# Patient Record
Sex: MALE | Race: WHITE | Employment: FULL TIME | URBAN - METROPOLITAN AREA
[De-identification: names, ages, dates, MRNs, and addresses within clinical notes are randomized per-mention and may not be internally consistent; named-entity substitution may affect disease eponyms.]

---

## 2018-03-22 ENCOUNTER — HOSPITAL ENCOUNTER (EMERGENCY)
Age: 33
Discharge: HOME | End: 2018-03-22
Attending: EMERGENCY MEDICINE
Payer: SELF-PAY

## 2018-03-22 ENCOUNTER — APPOINTMENT (OUTPATIENT)
Dept: CT IMAGING | Age: 33
End: 2018-03-22
Attending: EMERGENCY MEDICINE
Payer: SELF-PAY

## 2018-03-22 ENCOUNTER — APPOINTMENT (OUTPATIENT)
Dept: GENERAL RADIOLOGY | Age: 33
End: 2018-03-22
Attending: EMERGENCY MEDICINE
Payer: SELF-PAY

## 2018-03-22 VITALS
TEMPERATURE: 98 F | RESPIRATION RATE: 16 BRPM | HEIGHT: 68 IN | OXYGEN SATURATION: 98 % | HEART RATE: 88 BPM | DIASTOLIC BLOOD PRESSURE: 84 MMHG | SYSTOLIC BLOOD PRESSURE: 138 MMHG | WEIGHT: 185 LBS | BODY MASS INDEX: 28.04 KG/M2

## 2018-03-22 DIAGNOSIS — S60.222A CONTUSION OF LEFT HAND, INITIAL ENCOUNTER: ICD-10-CM

## 2018-03-22 DIAGNOSIS — S39.012A BACK STRAIN, INITIAL ENCOUNTER: ICD-10-CM

## 2018-03-22 DIAGNOSIS — M54.16 LUMBAR RADICULOPATHY: Primary | ICD-10-CM

## 2018-03-22 LAB
ATRIAL RATE: 79 BPM
CALCULATED P AXIS, ECG09: 40 DEGREES
CALCULATED R AXIS, ECG10: 37 DEGREES
CALCULATED T AXIS, ECG11: 48 DEGREES
DIAGNOSIS, 93000: NORMAL
P-R INTERVAL, ECG05: 134 MS
Q-T INTERVAL, ECG07: 350 MS
QRS DURATION, ECG06: 92 MS
QTC CALCULATION (BEZET), ECG08: 401 MS
VENTRICULAR RATE, ECG03: 79 BPM

## 2018-03-22 PROCEDURE — 74011250636 HC RX REV CODE- 250/636: Performed by: EMERGENCY MEDICINE

## 2018-03-22 PROCEDURE — 70450 CT HEAD/BRAIN W/O DYE: CPT

## 2018-03-22 PROCEDURE — 96374 THER/PROPH/DIAG INJ IV PUSH: CPT | Performed by: EMERGENCY MEDICINE

## 2018-03-22 PROCEDURE — 99284 EMERGENCY DEPT VISIT MOD MDM: CPT | Performed by: EMERGENCY MEDICINE

## 2018-03-22 PROCEDURE — 93005 ELECTROCARDIOGRAM TRACING: CPT | Performed by: EMERGENCY MEDICINE

## 2018-03-22 PROCEDURE — 72070 X-RAY EXAM THORAC SPINE 2VWS: CPT

## 2018-03-22 PROCEDURE — 73130 X-RAY EXAM OF HAND: CPT

## 2018-03-22 PROCEDURE — 72125 CT NECK SPINE W/O DYE: CPT

## 2018-03-22 PROCEDURE — 96375 TX/PRO/DX INJ NEW DRUG ADDON: CPT | Performed by: EMERGENCY MEDICINE

## 2018-03-22 PROCEDURE — 72100 X-RAY EXAM L-S SPINE 2/3 VWS: CPT

## 2018-03-22 RX ORDER — PHENTERMINE HYDROCHLORIDE 37.5 MG/1
37.5 CAPSULE ORAL
COMMUNITY
End: 2018-03-22

## 2018-03-22 RX ORDER — KETOROLAC TROMETHAMINE 30 MG/ML
15 INJECTION, SOLUTION INTRAMUSCULAR; INTRAVENOUS
Status: COMPLETED | OUTPATIENT
Start: 2018-03-22 | End: 2018-03-22

## 2018-03-22 RX ORDER — DEXAMETHASONE SODIUM PHOSPHATE 100 MG/10ML
10 INJECTION INTRAMUSCULAR; INTRAVENOUS
Status: COMPLETED | OUTPATIENT
Start: 2018-03-22 | End: 2018-03-22

## 2018-03-22 RX ORDER — PREDNISONE 20 MG/1
TABLET ORAL
Qty: 13 TAB | Refills: 0 | Status: SHIPPED | OUTPATIENT
Start: 2018-03-24

## 2018-03-22 RX ADMIN — DEXAMETHASONE SODIUM PHOSPHATE 10 MG: 10 INJECTION INTRAMUSCULAR; INTRAVENOUS at 10:40

## 2018-03-22 RX ADMIN — KETOROLAC TROMETHAMINE 15 MG: 30 INJECTION, SOLUTION INTRAMUSCULAR at 10:39

## 2018-03-22 NOTE — ED PROVIDER NOTES
HPI Comments: Patient had been working on his knees and had been bending over repeatedly lifting up tools when he had a sudden sharp pain in his lower back at the top of his buttocks that radiated up into his upper back. This caused him to fall to the left side and he landed awkwardly on his left leg and hand. Patient has some left hand pain and weakness in his  now. Complained of some weakness in his left leg and feeling like it gave out. Patient is some mild/moderate neck pain as well. He did not lose consciousness. He does complain of a headache. Patient is a 29 y.o. male presenting with back pain. The history is provided by the patient. Back Pain    This is a new problem. The current episode started less than 1 hour ago. The problem has not changed since onset. The problem occurs constantly. The pain is associated with twisting and lifting. The pain is present in the lumbar spine. The quality of the pain is described as sharp and shooting. Radiates to: up back. The pain is at a severity of 7/10. The symptoms are aggravated by certain positions, twisting and bending. Associated symptoms include numbness, leg pain, paresthesias, paresis and weakness. Pertinent negatives include no chest pain, no fever, no headaches, no abdominal pain and no tingling. Past Medical History:   Diagnosis Date    Hypertension        History reviewed. No pertinent surgical history. History reviewed. No pertinent family history. Social History     Social History    Marital status:      Spouse name: N/A    Number of children: N/A    Years of education: N/A     Occupational History    Not on file.      Social History Main Topics    Smoking status: Never Smoker    Smokeless tobacco: Never Used    Alcohol use Yes    Drug use: No    Sexual activity: Not on file     Other Topics Concern    Not on file     Social History Narrative    No narrative on file         ALLERGIES: Review of patient's allergies indicates no known allergies. Review of Systems   Constitutional: Negative for chills and fever. HENT: Negative for congestion, rhinorrhea and sore throat. Eyes: Negative for photophobia and redness. Respiratory: Negative for cough and shortness of breath. Cardiovascular: Negative for chest pain and leg swelling. Gastrointestinal: Negative for abdominal pain, diarrhea, nausea and vomiting. Endocrine: Negative for polydipsia and polyuria. Musculoskeletal: Positive for back pain and neck pain. Negative for myalgias. Neurological: Positive for weakness, numbness and paresthesias. Negative for tingling and headaches. Vitals:    03/22/18 0933   BP: 152/81   Pulse: 93   Resp: 19   Temp: 98 °F (36.7 °C)   SpO2: 98%   Weight: 83.9 kg (185 lb)   Height: 5' 8\" (1.727 m)            Physical Exam   Constitutional: He is oriented to person, place, and time. He appears well-developed and well-nourished. HENT:   Mouth/Throat: Oropharynx is clear and moist. No oropharyngeal exudate. Eyes: Conjunctivae are normal. Pupils are equal, round, and reactive to light. Neck: Neck supple. Cardiovascular: Normal rate, regular rhythm and normal heart sounds. Pulmonary/Chest: Effort normal and breath sounds normal.   Abdominal: Soft. Bowel sounds are normal. He exhibits no distension. There is no tenderness. There is no rebound and no guarding. Musculoskeletal: Normal range of motion. He exhibits no edema or tenderness. Lymphadenopathy:     He has no cervical adenopathy. Neurological: He is alert and oriented to person, place, and time. No cranial nerve deficit or sensory deficit. He exhibits normal muscle tone. Reflex Scores:       Tricep reflexes are 2+ on the right side and 2+ on the left side. Bicep reflexes are 2+ on the right side and 2+ on the left side. Brachioradialis reflexes are 2+ on the right side and 2+ on the left side.        Patellar reflexes are 3+ on the right side and 3+ on the left side. Achilles reflexes are 2+ on the right side and 2+ on the left side. Mild decrease in left toe flexion and extension as well as dorsiflexion and plantarflexion. Left  is slightly weaker than right but it seems to be secondary to pain. Skin: Skin is warm and dry. Nursing note and vitals reviewed. MDM  Number of Diagnoses or Management Options  Diagnosis management comments: I do not believe this represents a stroke. The patient was doing some bending and lifting and began having a sudden pain in his lower back that radiated up his back. He felt like his left side in the leg gave out and he felt the left side injuring his hand. He now has decreased  strength in his left hand but it seems to be secondary to pain on exam versus any motor or sensory deficit in the left upper extremity. There is no sensory deficit on the left side in the arm or leg. Disc herniation possible. Patient does not take any blood thinners has had no fevers suggest epidural abscess or hematoma. Reflexes are intact and neurologically he is otherwise intact. Patient does complain of a headache today since CT scan is ordered. Complains of neck pain after this awkward fall so we'll do a CT of the neck as well. Imaging of the spine given the trauma as well. Amount and/or Complexity of Data Reviewed  Tests in the radiology section of CPT®: ordered and reviewed (Xr Spine Thorac 2 V    Result Date: 3/22/2018  Lumbar and thoracic spine radiographs History: back pain. , back pain, injury, 29 years Male severe back pain after injury while working today. Patient had been bending over repeatedly lifting up tools when he had a sudden sharp pain in his lower back at the top of his buttocks that radiated up into his upper back. Patient then fell onto railroad track on his left side Comparison: None available Findings: Normal alignment is noted with no fracture or subluxation evident.  Mild posterior loss of vertebral body height of L4, with mild loss of disc space height L4-L5. The sacrum and sacroiliac joints are normal-appearing. The thoracic spine appears intact. The soft tissues are otherwise unremarkable. Impression:  No evidence of acute osseous injury. Xr Spine Lumb 2 Or 3 V    Result Date: 3/22/2018  Lumbar and thoracic spine radiographs History: back pain. , back pain, injury, 29 years Male severe back pain after injury while working today. Patient had been bending over repeatedly lifting up tools when he had a sudden sharp pain in his lower back at the top of his buttocks that radiated up into his upper back. Patient then fell onto railroad track on his left side Comparison: None available Findings: Normal alignment is noted with no fracture or subluxation evident. Mild posterior loss of vertebral body height of L4, with mild loss of disc space height L4-L5. The sacrum and sacroiliac joints are normal-appearing. The thoracic spine appears intact. The soft tissues are otherwise unremarkable. Impression:  No evidence of acute osseous injury. Xr Hand Lt Min 3 V    Result Date: 3/22/2018  Exam:  Left hand radiographs History:  pain, hand pain, injury, 29 years Male severe back pain after injury while working today. Patient had been bending over repeatedly lifting up tools when he had a sudden sharp pain in his lower back at the top of his buttocks that radiated up into his upper back. Patient then fell onto railroad track on his left side Comparison: None available Findings:  No evidence of acute fracture or dislocation. Normal alignment, joint spaces preserved. Normal mineralization. Visualized soft tissues otherwise unremarkable. Impression:  No evidence of acute injury.      Ct Head Wo Cont    Result Date: 3/22/2018  Examination: CT scan of the brain without contrast. History: left sided weakness, headache, neck pain, back pain, 33 years Male Pt states lost feeling in left side of body today and fell. NKI Technique: 5 mm axial imaging of the brain from the posterior fossa to the vertex. Radiation dose reduction techniques were used for this study:  Our CT scanners use one or all of the following: Automated exposure control, adjustment of the mA and/or kVp according to patient's size, iterative reconstruction. Comparison:  None available Findings: The brain parenchyma appears within normal limits for age. The ventricles, sulci are age-appropriate. No intracranial hemorrhage or extra-axial collection is identified. No evidence of acute infarct. No mass effect or midline shift is present. Basal cisterns are intact. The visualized paranasal sinuses and mastoid air cells are clear. The orbits, bones, and soft tissues are normal in appearance. IMPRESSION:  Normal unenhanced CT of the brain for age. No acute intracranial abnormality. Ct Spine Cerv Wo Cont    Result Date: 3/22/2018  Exam:  CT cervical spine without contrast History: neck pain, left hand pain/weakness, awkward fall, 33 years Male Pt states lost feeling in left side of body today and fell. NKI Technique: Standard departmental protocol CT of the cervical spine was performed without intravenous contrast administration. Coronal and sagittal reformats were obtained. Radiation dose reduction techniques were used for this study: Our CT scanners use one or all of the following: Automated exposure control, adjustment of the mA and/or kVp according to patient's size, iterative reconstruction. Comparison: None available Findings:  Normal alignment. Vertebral body height and disc space height preserved. No evidence of acute fracture or subluxation. No evidence of significant neuroforaminal or spinal canal stenosis. Normal mineralization. Visualized prevertebral and paravertebral soft tissues unremarkable. Impression:  No evidence of acute injury.     )          ED Course       Procedures

## 2018-03-22 NOTE — ED TRIAGE NOTES
Pt in via [facility] states sharp pain from left side of buttock radiating up left side followed by weakness on left side. States onset was at 0730 this morning. States pain is intermittent. No weakness noted on triage. No slurred speech, no facial droop. States at onset of pain he fell and hit right shoulder. C/o right shoulder pain.

## 2018-03-22 NOTE — ED NOTES
I have reviewed discharge instructions with the patient. The patient verbalized understanding. Patient left ED via Discharge Method: ambulatory to Home with self. Opportunity for questions and clarification provided. Patient given 1 scripts. To continue your aftercare when you leave the hospital, you may receive an automated call from our care team to check in on how you are doing. This is a free service and part of our promise to provide the best care and service to meet your aftercare needs.  If you have questions, or wish to unsubscribe from this service please call [phone]. Thank you for Choosing our [facility] Emergency Department.